# Patient Record
Sex: MALE | Race: OTHER | HISPANIC OR LATINO | Employment: FULL TIME | URBAN - METROPOLITAN AREA
[De-identification: names, ages, dates, MRNs, and addresses within clinical notes are randomized per-mention and may not be internally consistent; named-entity substitution may affect disease eponyms.]

---

## 2024-06-11 ENCOUNTER — OFFICE VISIT (OUTPATIENT)
Dept: URGENT CARE | Facility: CLINIC | Age: 24
End: 2024-06-11
Payer: COMMERCIAL

## 2024-06-11 VITALS
HEART RATE: 63 BPM | WEIGHT: 295 LBS | DIASTOLIC BLOOD PRESSURE: 82 MMHG | TEMPERATURE: 98.1 F | SYSTOLIC BLOOD PRESSURE: 124 MMHG | RESPIRATION RATE: 16 BRPM | BODY MASS INDEX: 34.13 KG/M2 | OXYGEN SATURATION: 97 % | HEIGHT: 78 IN

## 2024-06-11 DIAGNOSIS — Z48.02 VISIT FOR SUTURE REMOVAL: ICD-10-CM

## 2024-06-11 PROCEDURE — 15853 REMOVAL SUTR/STAPL XREQ ANES: CPT

## 2024-06-11 PROCEDURE — 99202 OFFICE O/P NEW SF 15 MIN: CPT

## 2024-06-11 PROCEDURE — 3079F DIAST BP 80-89 MM HG: CPT

## 2024-06-11 PROCEDURE — 3074F SYST BP LT 130 MM HG: CPT

## 2024-06-16 NOTE — PROGRESS NOTES
"  CHIEF COMPLAINT  Chief Complaint   Patient presents with    Suture / Staple Removal     Right shoulder has 6-7 stitches     Subjective:   Srinivas Geurra is a 24 y.o. male who presents to urgent care for suture removal 8 days post-op shoulder surgery. Patient recently relocated to this area, surgery completed in [place]. He reports post op follow with surgeon earlier this week. Patient denies any fever or chills.            PAST MEDICAL HISTORY  There are no problems to display for this patient.      SURGICAL HISTORY  patient denies any surgical history    ALLERGIES  No Known Allergies    CURRENT MEDICATIONS  Home Medications       Reviewed by Guanakito Rios (Medical Assistant) on 06/11/24 at 1403  Med List Status: <None>     Medication Last Dose Status        Patient Vern Taking any Medications                           SOCIAL HISTORY  Social History     Tobacco Use    Smoking status: Never    Smokeless tobacco: Never   Vaping Use    Vaping status: Never Used   Substance and Sexual Activity    Alcohol use: Not Currently    Drug use: Never    Sexual activity: Not Currently       FAMILY HISTORY  History reviewed. No pertinent family history.      Medications, Allergies, and current problem list reviewed today in Epic.     Objective:     /82   Pulse 63   Temp 36.7 °C (98.1 °F) (Temporal)   Resp 16   Ht 2.007 m (6' 7\")   Wt (!) 134 kg (295 lb)   SpO2 97%     Physical Exam  Skin:     Findings: No erythema.                Assessment/Plan:     Diagnosis and associated orders:     1. Visit for suture removal           Comments/MDM:     Wound: well healing laceration. 6 interrupted sutures removed without difficulty. No evidence of dehiscence or infection.  Neuro: sensory/motor intact   F/U prn          Differential diagnosis, natural history, supportive care, and indications for immediate follow-up discussed.    Advised the patient to follow-up with the primary care physician for recheck, reevaluation, " and consideration of further management.    Please note that this dictation was created using voice recognition software. I have made a reasonable attempt to correct obvious errors, but I expect that there are errors of grammar and possibly content that I did not discover before finalizing the note.    This note was electronically signed by SURAJ Ragland